# Patient Record
Sex: FEMALE | Race: WHITE | NOT HISPANIC OR LATINO | Employment: OTHER | URBAN - METROPOLITAN AREA
[De-identification: names, ages, dates, MRNs, and addresses within clinical notes are randomized per-mention and may not be internally consistent; named-entity substitution may affect disease eponyms.]

---

## 2022-09-17 ENCOUNTER — HOSPITAL ENCOUNTER (EMERGENCY)
Facility: HOSPITAL | Age: 52
Discharge: HOME | End: 2022-09-17
Attending: STUDENT IN AN ORGANIZED HEALTH CARE EDUCATION/TRAINING PROGRAM | Admitting: STUDENT IN AN ORGANIZED HEALTH CARE EDUCATION/TRAINING PROGRAM

## 2022-09-17 ENCOUNTER — APPOINTMENT (OUTPATIENT)
Dept: GENERAL RADIOLOGY | Facility: HOSPITAL | Age: 52
End: 2022-09-17

## 2022-09-17 VITALS
SYSTOLIC BLOOD PRESSURE: 137 MMHG | BODY MASS INDEX: 55.32 KG/M2 | OXYGEN SATURATION: 96 % | RESPIRATION RATE: 24 BRPM | TEMPERATURE: 98.3 F | HEART RATE: 66 BPM | HEIGHT: 61 IN | DIASTOLIC BLOOD PRESSURE: 80 MMHG | WEIGHT: 293 LBS

## 2022-09-17 DIAGNOSIS — J06.9 VIRAL URI WITH COUGH: Primary | ICD-10-CM

## 2022-09-17 LAB
ALBUMIN SERPL-MCNC: 3.8 G/DL (ref 3.5–5.2)
ALBUMIN/GLOB SERPL: 1.2 G/DL
ALP SERPL-CCNC: 96 U/L (ref 39–117)
ALT SERPL W P-5'-P-CCNC: 23 U/L (ref 1–33)
ANION GAP SERPL CALCULATED.3IONS-SCNC: 14.2 MMOL/L (ref 5–15)
AST SERPL-CCNC: 20 U/L (ref 1–32)
BASOPHILS # BLD AUTO: 0.05 10*3/MM3 (ref 0–0.2)
BASOPHILS NFR BLD AUTO: 0.5 % (ref 0–1.5)
BILIRUB SERPL-MCNC: 0.7 MG/DL (ref 0–1.2)
BUN SERPL-MCNC: 9 MG/DL (ref 6–20)
BUN/CREAT SERPL: 8.9 (ref 7–25)
CALCIUM SPEC-SCNC: 9.5 MG/DL (ref 8.6–10.5)
CHLORIDE SERPL-SCNC: 103 MMOL/L (ref 98–107)
CO2 SERPL-SCNC: 19.8 MMOL/L (ref 22–29)
CREAT SERPL-MCNC: 1.01 MG/DL (ref 0.57–1)
DEPRECATED RDW RBC AUTO: 39.9 FL (ref 37–54)
EGFRCR SERPLBLD CKD-EPI 2021: 67.1 ML/MIN/1.73
EOSINOPHIL # BLD AUTO: 0.21 10*3/MM3 (ref 0–0.4)
EOSINOPHIL NFR BLD AUTO: 2.2 % (ref 0.3–6.2)
ERYTHROCYTE [DISTWIDTH] IN BLOOD BY AUTOMATED COUNT: 12.8 % (ref 12.3–15.4)
FLUAV AG NPH QL: NEGATIVE
FLUBV AG NPH QL IA: NEGATIVE
GLOBULIN UR ELPH-MCNC: 3.3 GM/DL
GLUCOSE SERPL-MCNC: 150 MG/DL (ref 65–99)
HCT VFR BLD AUTO: 44.3 % (ref 34–46.6)
HGB BLD-MCNC: 13.9 G/DL (ref 12–15.9)
HOLD SPECIMEN: NORMAL
HOLD SPECIMEN: NORMAL
IMM GRANULOCYTES # BLD AUTO: 0.04 10*3/MM3 (ref 0–0.05)
IMM GRANULOCYTES NFR BLD AUTO: 0.4 % (ref 0–0.5)
LYMPHOCYTES # BLD AUTO: 2.46 10*3/MM3 (ref 0.7–3.1)
LYMPHOCYTES NFR BLD AUTO: 25.2 % (ref 19.6–45.3)
MCH RBC QN AUTO: 27 PG (ref 26.6–33)
MCHC RBC AUTO-ENTMCNC: 31.4 G/DL (ref 31.5–35.7)
MCV RBC AUTO: 86.2 FL (ref 79–97)
MONOCYTES # BLD AUTO: 0.56 10*3/MM3 (ref 0.1–0.9)
MONOCYTES NFR BLD AUTO: 5.7 % (ref 5–12)
NEUTROPHILS NFR BLD AUTO: 6.44 10*3/MM3 (ref 1.7–7)
NEUTROPHILS NFR BLD AUTO: 66 % (ref 42.7–76)
NRBC BLD AUTO-RTO: 0 /100 WBC (ref 0–0.2)
NT-PROBNP SERPL-MCNC: 98.5 PG/ML (ref 0–900)
PLATELET # BLD AUTO: 188 10*3/MM3 (ref 140–450)
PMV BLD AUTO: 11.6 FL (ref 6–12)
POTASSIUM SERPL-SCNC: 4 MMOL/L (ref 3.5–5.2)
PROT SERPL-MCNC: 7.1 G/DL (ref 6–8.5)
RBC # BLD AUTO: 5.14 10*6/MM3 (ref 3.77–5.28)
SARS-COV-2 RNA PNL SPEC NAA+PROBE: NOT DETECTED
SODIUM SERPL-SCNC: 137 MMOL/L (ref 136–145)
TROPONIN T SERPL-MCNC: <0.01 NG/ML (ref 0–0.03)
WBC NRBC COR # BLD: 9.76 10*3/MM3 (ref 3.4–10.8)
WHOLE BLOOD HOLD COAG: NORMAL
WHOLE BLOOD HOLD SPECIMEN: NORMAL

## 2022-09-17 PROCEDURE — 85025 COMPLETE CBC W/AUTO DIFF WBC: CPT | Performed by: STUDENT IN AN ORGANIZED HEALTH CARE EDUCATION/TRAINING PROGRAM

## 2022-09-17 PROCEDURE — 93010 ELECTROCARDIOGRAM REPORT: CPT | Performed by: INTERNAL MEDICINE

## 2022-09-17 PROCEDURE — 80053 COMPREHEN METABOLIC PANEL: CPT | Performed by: STUDENT IN AN ORGANIZED HEALTH CARE EDUCATION/TRAINING PROGRAM

## 2022-09-17 PROCEDURE — 87804 INFLUENZA ASSAY W/OPTIC: CPT | Performed by: PHYSICIAN ASSISTANT

## 2022-09-17 PROCEDURE — 93005 ELECTROCARDIOGRAM TRACING: CPT

## 2022-09-17 PROCEDURE — 99284 EMERGENCY DEPT VISIT MOD MDM: CPT

## 2022-09-17 PROCEDURE — 36415 COLL VENOUS BLD VENIPUNCTURE: CPT | Performed by: STUDENT IN AN ORGANIZED HEALTH CARE EDUCATION/TRAINING PROGRAM

## 2022-09-17 PROCEDURE — 83880 ASSAY OF NATRIURETIC PEPTIDE: CPT

## 2022-09-17 PROCEDURE — U0004 COV-19 TEST NON-CDC HGH THRU: HCPCS | Performed by: PHYSICIAN ASSISTANT

## 2022-09-17 PROCEDURE — 93005 ELECTROCARDIOGRAM TRACING: CPT | Performed by: STUDENT IN AN ORGANIZED HEALTH CARE EDUCATION/TRAINING PROGRAM

## 2022-09-17 PROCEDURE — 84484 ASSAY OF TROPONIN QUANT: CPT

## 2022-09-17 PROCEDURE — 71045 X-RAY EXAM CHEST 1 VIEW: CPT

## 2022-09-17 PROCEDURE — C9803 HOPD COVID-19 SPEC COLLECT: HCPCS | Performed by: PHYSICIAN ASSISTANT

## 2022-09-17 RX ORDER — HYDROXYZINE HYDROCHLORIDE 25 MG/1
25 TABLET, FILM COATED ORAL 3 TIMES DAILY PRN
COMMUNITY

## 2022-09-17 RX ORDER — SPIRONOLACTONE 50 MG/1
50 TABLET, FILM COATED ORAL DAILY
COMMUNITY

## 2022-09-17 RX ORDER — BUDESONIDE AND FORMOTEROL FUMARATE DIHYDRATE 80; 4.5 UG/1; UG/1
2 AEROSOL RESPIRATORY (INHALATION)
COMMUNITY

## 2022-09-17 RX ORDER — LORATADINE 10 MG/1
10 TABLET ORAL DAILY
COMMUNITY

## 2022-09-17 RX ORDER — BENZONATATE 100 MG/1
100 CAPSULE ORAL 3 TIMES DAILY PRN
Qty: 21 CAPSULE | Refills: 0 | Status: SHIPPED | OUTPATIENT
Start: 2022-09-17 | End: 2022-09-24

## 2022-09-17 RX ORDER — AZITHROMYCIN 250 MG/1
TABLET, FILM COATED ORAL
Qty: 6 TABLET | Refills: 0 | Status: SHIPPED | OUTPATIENT
Start: 2022-09-17

## 2022-09-17 RX ORDER — MONTELUKAST SODIUM 10 MG/1
10 TABLET ORAL NIGHTLY
COMMUNITY

## 2022-09-17 RX ORDER — BENZONATATE 100 MG/1
100 CAPSULE ORAL ONCE
Status: COMPLETED | OUTPATIENT
Start: 2022-09-17 | End: 2022-09-17

## 2022-09-17 RX ORDER — ALBUTEROL SULFATE 2.5 MG/3ML
2.5 SOLUTION RESPIRATORY (INHALATION) EVERY 4 HOURS PRN
COMMUNITY

## 2022-09-17 RX ORDER — HYDROCHLOROTHIAZIDE 50 MG/1
50 TABLET ORAL DAILY
COMMUNITY

## 2022-09-17 RX ORDER — METOPROLOL SUCCINATE 25 MG/1
25 TABLET, EXTENDED RELEASE ORAL DAILY
COMMUNITY

## 2022-09-17 RX ORDER — SODIUM CHLORIDE 0.9 % (FLUSH) 0.9 %
10 SYRINGE (ML) INJECTION AS NEEDED
Status: DISCONTINUED | OUTPATIENT
Start: 2022-09-17 | End: 2022-09-17 | Stop reason: HOSPADM

## 2022-09-17 RX ORDER — LEVOTHYROXINE SODIUM 0.07 MG/1
75 TABLET ORAL DAILY
COMMUNITY

## 2022-09-17 RX ADMIN — BENZONATATE 100 MG: 100 CAPSULE ORAL at 11:07

## 2022-09-17 NOTE — DISCHARGE INSTRUCTIONS
Your COVID-19 test results positive please contact your pulmonologist for recommendations on treatment options.  If you experience severe worsening shortness of breath, chest pain, lightheadedness dizziness return to ED for further evaluation.  I prescribed antibiotic as well as a cough suppressant.  We have provided an incentive spirometer.  Please use this to help prevent pneumonia as discussed. Use your albuterol as needed for shortness of breath as prescribed.

## 2022-09-17 NOTE — ED PROVIDER NOTES
Subjective   History of Present Illness  The patient is a 52-year-old female with a past medical history of asthma, COVID-19 long-haul her, hypertension, hypothyroidism, sleep apnea presents emergency department with chief complaint of cough, congestion, shortness of breath.  Patient states started with mild congestion on Monday (5 days ago). Thought this could be 2/2 allergies. Has been using flonase and nasal saline which started to improve symptoms. Was seen by pulmonology earlier this week and had clear CXR. Started having mild cough 2 days ago, worsening yesterday. Currently taking symbicort and has albuterol inhaler at home, but has not been using it. Of note, pt had complicated course of COVID-19 where she was intubated 8 days. Has shortness of breath on exertion at baseline.     History provided by:  Patient   used: No    Influenza  Presenting symptoms: cough (mildly productive), diarrhea (mild yesterday), headache and shortness of breath    Presenting symptoms: no fever, no myalgias, no nausea, no sore throat and no vomiting    Headaches:     Severity:  Moderate    Onset quality:  Gradual    Duration:  2 days    Timing:  Constant    Progression:  Worsening    Chronicity:  New  Severity:  Moderate  Onset quality:  Gradual  Duration:  2 days  Progression:  Worsening  Chronicity:  New  Relieved by:  Nothing  Exacerbated by: walking, coughing.  Ineffective treatments:  None tried  Associated symptoms: chills and nasal congestion        Review of Systems   Constitutional: Positive for chills. Negative for fever.   HENT: Positive for congestion. Negative for sore throat.    Respiratory: Positive for cough (mildly productive) and shortness of breath.    Cardiovascular: Negative for chest pain, palpitations and leg swelling.   Gastrointestinal: Positive for diarrhea (mild yesterday). Negative for abdominal pain, nausea and vomiting.   Genitourinary: Negative for dysuria.   Musculoskeletal:  "Negative for myalgias.   Neurological: Positive for headaches.   All other systems reviewed and are negative.      Past Medical History:   Diagnosis Date   • Arthritis    • Asthma    • COVID-19 long hauler     2 years   • Hypertension    • Hypothyroidism    • Sleep apnea        Allergies   Allergen Reactions   • Demerol [Meperidine] Shortness Of Breath   • Other Anaphylaxis     pt rec'd \"gi cocktail\"   • Hydrocodone Other (See Comments)     Suicidal      • Prednisone Other (See Comments)     hypertenson       • Vancomycin Other (See Comments)     Red dejah syndrome        Past Surgical History:   Procedure Laterality Date   • CARDIAC CATHETERIZATION         History reviewed. No pertinent family history.    Social History     Socioeconomic History   • Marital status: Single   Tobacco Use   • Smoking status: Never Smoker   • Smokeless tobacco: Never Used           Objective   Physical Exam  Vitals and nursing note reviewed.   Constitutional:       Appearance: Normal appearance. She is obese. She is not ill-appearing or toxic-appearing.   HENT:      Head: Normocephalic.      Nose: Nose normal.      Mouth/Throat:      Mouth: Mucous membranes are moist.   Eyes:      Conjunctiva/sclera: Conjunctivae normal.   Cardiovascular:      Rate and Rhythm: Normal rate and regular rhythm.      Heart sounds: No murmur heard.  Pulmonary:      Effort: Pulmonary effort is normal.      Breath sounds: Normal breath sounds. No decreased breath sounds, wheezing, rhonchi or rales.   Musculoskeletal:         General: Normal range of motion.      Cervical back: Normal range of motion.      Right lower leg: No edema.      Left lower leg: No edema.   Skin:     General: Skin is warm and dry.      Capillary Refill: Capillary refill takes less than 2 seconds.   Neurological:      Mental Status: She is alert.         Procedures           ED Course                                           MDM     Very pleasant 52-year-old female presents to ED with " chief complaint of cough and shortness of breath.  She had a complicated course of COVID-19 in 2020 which required 8 days of mechanical ventilation.  She is COVID vaccinated.  Cough, shortness of breath started 2 days ago.  Nonproductive.  Has not required albuterol inhaler.  Had pulmonology appt this week that was good.     Breathsounds are clear to auscultation bilaterally, no wheezing or respiratory distress  Oxygen is 97% on room air, ambulatory oxygen dropped to 92%    CBC with WBC of 9.7, no neutrophilia, H&H of NL  BMP WNL  Trope negative  CMP with minimally elevated creatinine 1.01 which is improved from all prior on file, normal hepatic function, no anion gap  Flu negative  COVID pending  CXR shows no cardiopulmonary abnormality  EKG unremarkable    The patient is resting comfortably and feels better, is alert and in no distress. Because of complicated course previously requesting azithromycin. I feel the benefits outweigh the risks at this time. Will provide incentive spirometer and rx for tessalon perles. If COVID positive pt advised to call her pulmonologist to get recommendations on treatment options.     On re-examination the patient does not appear toxic and has no meningeal signs (including a negative Kernig and Brudzinski sign), and there's no intractable vomiting, respiratory distress and no apparent pain. Based on the history, exam, diagnostic testing and reassessment, the patient has no signs of meningitis, significant pneumonia, pyelonephritis, sepsis or other acute serious bacterial infections, or other significant pathology to warrant further testing, continued ED treatment, admission or specialist evaluation. The patient's vital signs have been stable. The patient's condition is stable and is appropriate for discharge.  The patient´s symptoms are consistent with a viral syndrome. The patient was counseled to return to the ED for re-evaluation for worsening cough, shortness of breath,  uncontrollable headache, uncontrollable fever, altered mental status, or any symptoms which cause them concern. The patient will pursue further outpatient evaluation with the primary care physician or other designated or consultant physician as indicated in the discharge instructions.     Labs Reviewed   COMPREHENSIVE METABOLIC PANEL - Abnormal; Notable for the following components:       Result Value    Glucose 150 (*)     Creatinine 1.01 (*)     CO2 19.8 (*)     All other components within normal limits    Narrative:     GFR Normal >60  Chronic Kidney Disease <60  Kidney Failure <15     CBC WITH AUTO DIFFERENTIAL - Abnormal; Notable for the following components:    MCHC 31.4 (*)     All other components within normal limits   INFLUENZA ANTIGEN, RAPID - Normal   BNP (IN-HOUSE) - Normal    Narrative:     Among patients with dyspnea, NT-proBNP is highly sensitive for the detection of acute congestive heart failure. In addition NT-proBNP of <300 pg/ml effectively rules out acute congestive heart failure with 99% negative predictive value.    Results may be falsely decreased if patient taking Biotin.     TROPONIN (IN-HOUSE) - Normal    Narrative:     Troponin T Reference Range:  <= 0.03 ng/mL-   Negative for AMI  >0.03 ng/mL-     Abnormal for myocardial necrosis.  Clinicians would have to utilize clinical acumen, EKG, Troponin and serial changes to determine if it is an Acute Myocardial Infarction or myocardial injury due to an underlying chronic condition.       Results may be falsely decreased if patient taking Biotin.     COVID-19,APTIMA PANTHER (ELYSIA) YEVGENIY/ANDREINA CORLEY, NP/OP SWAB IN UTM/VTM/SALINE TRANSPORT MEDIA,24 HR TAT   RAINBOW DRAW    Narrative:     The following orders were created for panel order Hart Draw.  Procedure                               Abnormality         Status                     ---------                               -----------         ------                     Green Top (Gel)[[id number]]                                   Final result               Lavender Top[[id number]]                                     Final result               Gold Top - SST[[id number]]                                   Final result               Light Blue Top[[id number]]                                   Final result                 Please view results for these tests on the individual orders.   CBC AND DIFFERENTIAL    Narrative:     The following orders were created for panel order CBC & Differential.  Procedure                               Abnormality         Status                     ---------                               -----------         ------                     CBC Auto Differential[[id number]]        Abnormal            Final result                 Please view results for these tests on the individual orders.   GREEN TOP   LAVENDER TOP   GOLD TOP - SST   LIGHT BLUE TOP      XR Chest 1 View   Final Result    No active disease is seen.                        LISSA CLAROS MD          Electronically Signed and Approved By: LISSA CLAROS MD on 9/17/2022 at 10:00                              Final diagnoses:   Viral URI with cough       ED Disposition  ED Disposition     ED Disposition   Discharge    Condition   Stable    Comment   --             Mario Al, APRN  [address] [facility], [facility] [address]  [facility] [address] [phone]          [facility] EMERGENCY ROOM  [address] [facility] [address] [phone]    If symptoms worsen         Medication List      New Prescriptions    azithromycin 250 MG tablet  Commonly known as: Zithromax Z-Junior  Take 2 tablets by mouth on day 1, then 1 tablet daily on days 2-5     benzonatate 100 MG capsule  Commonly known as: TESSALON  Take 1 capsule by mouth 3 (Three) Times a Day As Needed for Cough for up to 7 days.           Where to Get Your Medications      These medications were sent to [facility]/pharmacy #[id number] [facility], [place] - [address] [facility]  - [phone] [facility] [phone] FX  [address] [place] [address]    Hours: 24-hours Phone: [phone]   · azithromycin 250 MG tablet  · benzonatate 100 MG capsule          Obi Fox, JOI  09/17/22 4755

## 2022-09-22 LAB — QT INTERVAL: 407 MS

## 2024-07-20 ENCOUNTER — APPOINTMENT (OUTPATIENT)
Dept: GENERAL RADIOLOGY | Facility: HOSPITAL | Age: 54
End: 2024-07-20
Payer: MEDICARE

## 2024-07-20 VITALS
TEMPERATURE: 98.5 F | HEART RATE: 104 BPM | RESPIRATION RATE: 20 BRPM | OXYGEN SATURATION: 99 % | DIASTOLIC BLOOD PRESSURE: 95 MMHG | SYSTOLIC BLOOD PRESSURE: 168 MMHG

## 2024-07-20 PROCEDURE — 73562 X-RAY EXAM OF KNEE 3: CPT

## 2024-07-20 PROCEDURE — 99283 EMERGENCY DEPT VISIT LOW MDM: CPT

## 2024-07-20 PROCEDURE — 73630 X-RAY EXAM OF FOOT: CPT

## 2024-07-20 PROCEDURE — 96372 THER/PROPH/DIAG INJ SC/IM: CPT

## 2024-07-21 ENCOUNTER — HOSPITAL ENCOUNTER (EMERGENCY)
Facility: HOSPITAL | Age: 54
Discharge: HOME | End: 2024-07-21
Attending: EMERGENCY MEDICINE | Admitting: EMERGENCY MEDICINE
Payer: MEDICARE
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
## 2024-07-21 DIAGNOSIS — S83.92XA SPRAIN OF LEFT KNEE, UNSPECIFIED LIGAMENT, INITIAL ENCOUNTER: ICD-10-CM

## 2024-07-21 DIAGNOSIS — S90.32XA CONTUSION OF LEFT FOOT, INITIAL ENCOUNTER: Primary | ICD-10-CM

## 2024-07-21 PROCEDURE — 96372 THER/PROPH/DIAG INJ SC/IM: CPT

## 2024-07-21 PROCEDURE — 25010000002 KETOROLAC TROMETHAMINE PER 15 MG: Performed by: NURSE PRACTITIONER

## 2024-07-21 RX ORDER — KETOROLAC TROMETHAMINE 30 MG/ML
30 INJECTION, SOLUTION INTRAMUSCULAR; INTRAVENOUS ONCE
Status: COMPLETED | OUTPATIENT
Start: 2024-07-21 | End: 2024-07-21

## 2024-07-21 RX ADMIN — KETOROLAC TROMETHAMINE 30 MG: 30 INJECTION, SOLUTION INTRAMUSCULAR; INTRAVENOUS at 02:01

## 2024-07-21 NOTE — ED PROVIDER NOTES
"Time: 10:18 PM EDT  Date of encounter:  7/20/2024  Independent Historian/Clinical History and Information was obtained by:   Patient    History is limited by: N/A    Chief Complaint: LEFT FOOT/KNEE INJURY      History of Present Illness:  The patient is a 54 y.o. year old female who presents to the emergency department for evaluation of left foot pain, left knee pain.  The patient states that she fell from standing while walking.  She denies any head trauma or LOC.  She has been having trouble putting weight on her left toes.  She denies any previous injury.  She is neurovascular intact.  She can flex and extend the toes to the ankle and when the but does report increased pain with any type of movement or weightbearing.  There is no obvious deformity, bruising, or significant swelling noted.      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    Home Medications:  Prior to Admission medications    Medication Sig Start Date End Date Taking? Authorizing Provider   albuterol (PROVENTIL) (2.5 MG/3ML) 0.083% nebulizer solution Take 2.5 mg by nebulization Every 4 (Four) Hours As Needed for Wheezing.    Provider, Leanna, MD   azithromycin (Zithromax Z-Junior) 250 MG tablet Take 2 tablets by mouth on day 1, then 1 tablet daily on days 2-5 9/17/22   Obi Fox PA-C   budesonide-formoterol (SYMBICORT) 80-4.5 MCG/ACT " inhaler Inhale 2 puffs 2 (Two) Times a Day.    Leanna Gusman MD   hydroCHLOROthiazide (HYDRODIURIL) 50 MG tablet Take 50 mg by mouth Daily.    Leanna Gusman MD   hydrOXYzine (ATARAX) 25 MG tablet Take 25 mg by mouth 3 (Three) Times a Day As Needed for Itching.    Leanna Gusman MD   levothyroxine (SYNTHROID, LEVOTHROID) 75 MCG tablet Take 75 mcg by mouth Daily.    Leanna Gusman MD   loratadine (Claritin) 10 MG tablet Take 10 mg by mouth Daily.    Leanna Gusman MD   metoprolol succinate XL (TOPROL-XL) 25 MG 24 hr tablet Take 25 mg by mouth Daily.    Leanna Gusman MD   montelukast (SINGULAIR) 10 MG tablet Take 10 mg by mouth Every Night.    Leanna Gusman MD   spironolactone (ALDACTONE) 50 MG tablet Take 50 mg by mouth Daily.    Leanna Gusman MD        Social History:   Social History     Tobacco Use    Smoking status: Never    Smokeless tobacco: Never         Review of Systems:  Review of Systems   Constitutional:  Negative for chills and fever.   HENT:  Negative for congestion, ear pain and sore throat.    Eyes:  Negative for pain.   Respiratory:  Negative for cough, chest tightness and shortness of breath.    Cardiovascular:  Negative for chest pain.   Gastrointestinal:  Negative for abdominal pain, diarrhea, nausea and vomiting.   Genitourinary:  Negative for flank pain and hematuria.   Musculoskeletal:  Positive for arthralgias, gait problem and myalgias. Negative for joint swelling, neck pain and neck stiffness.   Skin:  Negative for color change, pallor and rash.   Neurological:  Negative for seizures and headaches.   All other systems reviewed and are negative.       Physical Exam:  /95   Pulse 104   Temp 98.5 °F (36.9 °C)   Resp 20   SpO2 99%     Physical Exam  Vitals and nursing note reviewed.   Constitutional:       General: She is not in acute distress.     Appearance: Normal appearance. She is not ill-appearing or toxic-appearing.    HENT:      Head: Normocephalic and atraumatic.   Eyes:      General: No scleral icterus.     Conjunctiva/sclera: Conjunctivae normal.      Pupils: Pupils are equal, round, and reactive to light.   Cardiovascular:      Rate and Rhythm: Normal rate and regular rhythm.      Pulses: Normal pulses.   Pulmonary:      Effort: Pulmonary effort is normal. No respiratory distress.   Musculoskeletal:         General: Tenderness and signs of injury present. No swelling or deformity. Normal range of motion.      Cervical back: Normal range of motion.   Skin:     General: Skin is warm and dry.      Capillary Refill: Capillary refill takes less than 2 seconds.      Findings: No bruising, erythema or rash.   Neurological:      General: No focal deficit present.      Mental Status: She is alert and oriented to person, place, and time. Mental status is at baseline.   Psychiatric:         Mood and Affect: Mood normal.         Behavior: Behavior normal.                Procedures:  Procedures      Medical Decision Making:      Comorbidities that affect care:    Hypothyroid, asthma, arthritis, COVID-19 long hauler, sleep apnea, hypertension    External Notes reviewed:    None      The following orders were placed and all results were independently analyzed by me:  Orders Placed This Encounter   Procedures    [facility] 08.  CAM Boot; Yes; No; Yes    XR Foot 3+ View Left    XR Knee 3 View Left    Apply ace wrap    Obtain & Apply The Following- Lower extremity; Knee immobilizer       Medications Given in the Emergency Department:  Medications   ketorolac (TORADOL) injection 30 mg (30 mg Intramuscular Given 7/21/24 0201)        ED Course:    ED Course as of 07/21/24 0724   Sat Jul 20, 2024   2222 PROVIDER IN TRIAGE  Patient was evaluated by Florentin pickard PA-C. Orders were placed and awaiting final results and disposition.   [MV]      ED Course User Index  [MV] Florentin Mahan PA       Labs:    Lab Results (last 24 hours)        ** No results found for the last 24 hours. **             Imaging:    XR Foot 3+ View Left    Result Date: 7/20/2024  XR FOOT 3+ VW LEFT Date of Exam: 7/20/2024 10:36 PM EDT Indication: Pain after fall. Comparison: None available. Findings: Posterior and plantar calcaneal spurs are present. Mild degenerative disease noted at the first MTP joint. No acute fracture or dislocation. No bone erosion or destruction. No foreign body.     Degenerative changes. No acute findings. Electronically Signed: Kyle Thomas MD  7/20/2024 10:51 PM EDT  Workstation ID: [id number]    XR Knee 3 View Left    Result Date: 7/20/2024  XR KNEE 3 VW LEFT Date of Exam: 7/20/2024 10:36 PM EDT Indication: Pain after fall. Comparison: None available. Findings: There is tricompartmental osteoarthritis with mild medial compartment joint space narrowing. No fracture or dislocation. No bone erosion or destruction. No joint effusion.     Degenerative disease. No acute findings. Electronically Signed: Kyle Thomas MD  7/20/2024 10:49 PM EDT  Workstation ID: [id number]       Differential Diagnosis and Discussion:    Extremity Pain: Differential diagnosis includes but is not limited to soft tissue sprain, tendonitis, tendon injury, dislocation, fracture, deep vein thrombosis, arterial insufficiency, osteoarthritis, bursitis, and ligamentous damage.  Joint Pain: Differential diagnosis includes but is not limited to polyarticular arthritis, gout, tendinitis, hemarthrosis, septic arthritis, rheumatoid arthritis, bursitis, degenerative joint disease, joint effusion, autoimmune disorder, trauma, and occult neoplasm.    All X-rays impressions were independently interpreted by me.    MDM  Number of Diagnoses or Management Options  Contusion of left foot, initial encounter: new and requires workup  Fall, initial encounter: minor  Sprain of left knee, unspecified ligament, initial encounter: new and requires workup     Amount and/or Complexity of Data  Reviewed  Tests in the radiology section of CPT®: reviewed    Risk of Complications, Morbidity, and/or Mortality  Presenting problems: low  Diagnostic procedures: low  Management options: low    Patient Progress  Patient progress: stable         Patient Care Considerations:    LABS: I considered ordering labs, however considering the patient stable condition and complaint I did not feel it was warranted at this time.      Consultants/Shared Management Plan:    None    Social Determinants of Health:    Patient is independent, reliable, and has access to care.       Disposition and Care Coordination:    Discharged: The patient is suitable and stable for discharge with no need for consideration of admission.    I have explained the patient´s condition, diagnoses and treatment plan based on the information available to me at this time. I have answered questions and addressed any concerns. The patient has a good  understanding of the patient´s diagnosis, condition, and treatment plan as can be expected at this point. The vital signs have been stable. The patient´s condition is stable and appropriate for discharge from the emergency department.      The patient will pursue further outpatient evaluation with the primary care physician or other designated or consulting physician as outlined in the discharge instructions. They are agreeable to this plan of care and follow-up instructions have been explained in detail. The patient has received these instructions in written format and has expressed an understanding of the discharge instructions. The patient is aware that any significant change in condition or worsening of symptoms should prompt an immediate return to this or the closest emergency department or call to 911.  I have explained discharge medications and the need for follow up with the patient/caretakers. This was also printed in the discharge instructions. Patient was discharged with the following medications and  follow up:      Medication List      No changes were made to your prescriptions during this visit.      Abe Mancuso MD  [address]   [place] [address]  [phone]    Call   As needed, FOR FOLLOW UP    Mario Al, APRN  [address]  [facility] [id number] [phone]    Call   FOR FOLLOW UP       Final diagnoses:   Contusion of left foot, initial encounter   Sprain of left knee, unspecified ligament, initial encounter   Fall, initial encounter        ED Disposition       ED Disposition   Discharge    Condition   Stable    Comment   --               This medical record created using voice recognition software.             Shantelle Gomez, APRN  07/21/24 7021

## 2024-07-21 NOTE — DISCHARGE INSTRUCTIONS
Rest, ice, and elevate.  Gentle range of motion stretches with your foot and knee followed by 20 minutes of ice to each several times daily.  Use your Ace wrap to help with swelling and comfort.  Use your walking boot for ambulation.  Use your home walker to help stabilize yourself with walking.  Follow-up either with your orthopedic in [place] or with Dr. Mancuso for further evaluation and treatment.  Follow-up with your family doctor as needed.  Return to the emergency department immediately for any acutely developing redness, any increase in swelling, any inability to flex or extend your ankle foot or knee or any new or worse concerns.

## 2024-10-22 ENCOUNTER — OFFICE VISIT (OUTPATIENT)
Dept: PULMONOLOGY | Facility: CLINIC | Age: 54
End: 2024-10-22
Payer: MEDICARE

## 2024-10-22 VITALS
OXYGEN SATURATION: 99 % | RESPIRATION RATE: 20 BRPM | BODY MASS INDEX: 55.32 KG/M2 | TEMPERATURE: 97.9 F | HEIGHT: 61 IN | DIASTOLIC BLOOD PRESSURE: 72 MMHG | HEART RATE: 71 BPM | SYSTOLIC BLOOD PRESSURE: 136 MMHG | WEIGHT: 293 LBS

## 2024-10-22 DIAGNOSIS — J45.20 MILD INTERMITTENT ASTHMA, UNSPECIFIED WHETHER COMPLICATED: ICD-10-CM

## 2024-10-22 DIAGNOSIS — U09.9 POST-COVID CHRONIC DYSPNEA: Primary | ICD-10-CM

## 2024-10-22 DIAGNOSIS — R06.09 POST-COVID CHRONIC DYSPNEA: Primary | ICD-10-CM

## 2024-10-22 PROCEDURE — 1160F RVW MEDS BY RX/DR IN RCRD: CPT | Performed by: INTERNAL MEDICINE

## 2024-10-22 PROCEDURE — 99204 OFFICE O/P NEW MOD 45 MIN: CPT | Performed by: INTERNAL MEDICINE

## 2024-10-22 PROCEDURE — 1159F MED LIST DOCD IN RCRD: CPT | Performed by: INTERNAL MEDICINE

## 2024-10-22 RX ORDER — KETOROLAC TROMETHAMINE 4 MG/ML
SOLUTION/ DROPS OPHTHALMIC EVERY 6 HOURS SCHEDULED
COMMUNITY

## 2024-10-22 RX ORDER — DIPHENOXYLATE HYDROCHLORIDE AND ATROPINE SULFATE 2.5; .025 MG/1; MG/1
1 TABLET ORAL DAILY
COMMUNITY

## 2024-10-22 RX ORDER — LANSOPRAZOLE 30 MG/1
CAPSULE, DELAYED RELEASE ORAL
COMMUNITY

## 2024-10-22 RX ORDER — ECHINACEA PURPUREA EXTRACT 125 MG
1 TABLET ORAL
COMMUNITY

## 2024-10-22 RX ORDER — METFORMIN HCL 500 MG
500 TABLET, EXTENDED RELEASE 24 HR ORAL
COMMUNITY
Start: 2024-08-12

## 2024-10-22 RX ORDER — SACCHAROMYCES BOULARDII 250 MG
250 CAPSULE ORAL 2 TIMES DAILY
COMMUNITY

## 2024-10-22 RX ORDER — BIOTIN 10000 MCG
CAPSULE ORAL DAILY
COMMUNITY

## 2024-10-22 RX ORDER — DIPHENHYDRAMINE HCL 25 MG
25 CAPSULE ORAL
COMMUNITY

## 2024-10-22 RX ORDER — DEXTROMETHORPHAN HYDROBROMIDE AND PROMETHAZINE HYDROCHLORIDE 15; 6.25 MG/5ML; MG/5ML
SYRUP ORAL
COMMUNITY
Start: 2024-07-29

## 2024-10-22 RX ORDER — ACETAMINOPHEN 325 MG/1
650 TABLET ORAL EVERY 6 HOURS PRN
COMMUNITY

## 2024-10-22 RX ORDER — LEVOCETIRIZINE DIHYDROCHLORIDE 5 MG/1
5 TABLET, FILM COATED ORAL DAILY
COMMUNITY

## 2024-10-22 RX ORDER — ACETAMINOPHEN 500 MG
TABLET ORAL EVERY 6 HOURS PRN
COMMUNITY

## 2024-10-22 RX ORDER — GABAPENTIN 300 MG/1
300 CAPSULE ORAL
COMMUNITY
Start: 2024-09-26

## 2024-10-22 RX ORDER — FLUTICASONE PROPIONATE 50 UG/1
SPRAY, METERED NASAL EVERY 24 HOURS
COMMUNITY

## 2024-10-22 RX ORDER — LIDOCAINE HYDROCHLORIDE 20 MG/ML
SOLUTION OROPHARYNGEAL
COMMUNITY
Start: 2024-06-14

## 2024-10-22 RX ORDER — AZELASTINE HYDROCHLORIDE 0.5 MG/ML
SOLUTION/ DROPS OPHTHALMIC EVERY 12 HOURS SCHEDULED
COMMUNITY

## 2024-10-22 RX ORDER — BUDESONIDE AND FORMOTEROL FUMARATE DIHYDRATE 80; 4.5 UG/1; UG/1
2 AEROSOL RESPIRATORY (INHALATION)
Qty: 10.2 EACH | Refills: 6 | Status: SHIPPED | OUTPATIENT
Start: 2024-10-22

## 2024-10-22 RX ORDER — FEXOFENADINE HCL 180 MG/1
1 TABLET ORAL DAILY
COMMUNITY

## 2024-10-22 RX ORDER — CETIRIZINE HYDROCHLORIDE 10 MG/1
10 TABLET ORAL DAILY
COMMUNITY

## 2024-10-22 NOTE — PROGRESS NOTES
Chief Complaint  Establish Care (New patient- Post Covid-19 Syndrome), Cough (Dry cough every now and then), and Shortness of Breath (SOA- consistent, seems to be getting worse)    Subjective        Stefani Villafana presents to [facility] PULMONARY & CRITICAL CARE MEDICINE  History of Present Illness  History of Present Illness  The patient is a 54-year-old female who presents for evaluation of lung problems.    She contracted COVID-19 in 03/2020, which necessitated an 8-day period on a ventilator. Her treatment included hydroxychloroquine and azithromycin. She was discharged from the hospital 2 to 3 days after being weaned off the ventilator and was sent home with a COVID-19 home program. She was diagnosed with long COVID-19 by Dr. Romero and was advised to avoid strenuous activity to prevent a relapse. She experiences fatigue and shortness of breath, which have prevented her from returning to work. She was able to discontinue the use of a walker and began attending the gym 3 to 4 times a week. However, she experienced a setback in 10/2023, which left her unable to dress herself due to shortness of breath. A chest x-ray revealed fluid in her lungs, and she was diagnosed with heart failure. She experiences dizziness upon standing and has noticed changes in her orthostatic blood pressure readings. She underwent a right and left heart catheterization, which showed no pulmonary hypertension. She uses a CPAP machine and was switched from Symbicort to Breo, but has not been taking it due to difficulty obtaining it. Her last pulmonary function test (PFT) was over a year ago and showed normal results. She has not undergone pulmonary rehabilitation. She has been practicing mindfulness and breathing exercises. She participated in a remdesivir trial and believes she received the medication as her condition improved.    She has a history of asthma and allergies, but her asthma is currently well-managed. She  "keeps a rescue inhaler on hand but rarely needs to use it. She has not used a nebulizer since her hospital discharge. She experiences chest tightness and a burning sensation in her lungs, which she believes may be due to seasonal changes. She has noticed a drop in her oxygen levels when walking and has experienced lightheadedness.    She experiences anxiety when visiting the ER, which has led to panic attacks. She has requested Ativan for these visits.    She underwent a lumpectomy in 12/2020. She had a biopsy on her right breast and had a lumpectomy. She had another biopsy which was benign. She had a precancerous mass. They will not give her the medicine to keep it from going into cancer because of her COVID-19 symptoms and complications. She is being monitored closely. They found something on her last one and they were waiting until 01/2025 to redo it again.    She developed diabetes following her COVID-19 infection. She has experienced kidney issues and her kidney function fluctuates. She avoids NSAIDs to protect her kidneys. She was hospitalized for a week in 2023 due to vasculitis and was treated with steroids. She developed a stutter and tremor, which she attributes to the steroids. She was diagnosed with Henoch-Schonlein purpura (HSP), which caused wounds on her legs that are still visible.    She had a fall and broke her foot. She had issues with GERD. She had damaged vocal cords and did speech therapy.    SOCIAL HISTORY  She was a travel nurse. 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    ALLERGIES  She is allergic to TOBACCO.    Objective   Vital Signs:  /72 (BP Location: Other (Comment), Patient Position: Sitting, Cuff Size: Adult) Comment (BP Location): left wrist  Pulse 71   Temp 97.9 °F (36.6 °C) (Oral)   Resp 20   Ht 154.9 cm (61\")   Wt (!) 148 kg (327 lb)   SpO2 99% Comment: room air  BMI 61.79 kg/m²   Estimated body mass index is 61.79 kg/m² as calculated from the " "following:    Height as of this encounter: 154.9 cm (61\").    Weight as of this encounter: 148 kg (327 lb).          Physical Exam   Physical Exam  Pleasant female  Resting comfortably  In no acute distress  Lungs are clear to auscultation    Result Review :         Results                Assessment and Plan   Diagnoses and all orders for this visit:    1. Post-COVID chronic dyspnea (Primary)  -     Complete PFT - Pre & Post Bronchodilator; Future  -     6 Minute Walk Test; Future    2. Mild intermittent asthma, unspecified whether complicated  -     CBC & Differential; Future  -     IgE Level; Future    Other orders  -     budesonide-formoterol (SYMBICORT) 80-4.5 MCG/ACT inhaler; Inhale 2 puffs 2 (Two) Times a Day.  Dispense: 10.2 each; Refill: 6      Assessment & Plan  1. Post COVID-19 Syndrome.  Her asthma appears to be inactive at present. She reports persistent shortness of breath and fatigue, with symptoms worsening over the past few days. A pulmonary function test (PFT) and a 6-minute walk test will be conducted at our facility. If the PFT results indicate eligibility for pulmonary rehabilitation, this will be recommended. Insurance coverage for pulmonary rehabilitation will be verified to ensure affordability.    2. Asthma.  Her asthma is currently well-controlled. She is not experiencing nocturnal symptoms and rarely needs to use her rescue inhaler. She was previously switched from Symbicort to Breo but has had difficulty obtaining Breo due to preauthorization issues. She will be advised to use Symbicort as a controller inhaler if she experiences increased symptoms and needs to use her rescue inhaler frequently.    3. Anxiety and PTSD.  She experiences significant anxiety and panic attacks, particularly when visiting the ER, likely related to PTSD from her COVID-19 experience. She has been using hydroxyzine but finds it ineffective during severe anxiety episodes. She will discuss with her primary care " physician the possibility of using Ativan for premedication before ER visits.    4. Diabetes Mellitus.  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 She should continue monitoring her blood sugar levels and follow her current diabetes management plan.    5. Henoch-Schönlein Purpura (HSP).  She has a history of HSP, which was managed with steroids. She should continue to avoid NSAIDs to protect her kidney function.             Follow Up   Return in about 3 months (around 1/22/2025).  Patient was given instructions and counseling regarding her condition or for health maintenance advice. Please see specific information pulled into the AVS if appropriate.         Patient or patient representative verbalized consent for the use of Ambient Listening during the visit with  Yonis Conte DO for chart documentation. 10/22/2024  14:36 EDT

## 2025-02-04 ENCOUNTER — LAB (OUTPATIENT)
Facility: HOSPITAL | Age: 55
End: 2025-02-04
Payer: MEDICARE

## 2025-02-04 ENCOUNTER — OFFICE VISIT (OUTPATIENT)
Dept: PULMONOLOGY | Facility: CLINIC | Age: 55
End: 2025-02-04
Payer: MEDICARE

## 2025-02-04 VITALS
SYSTOLIC BLOOD PRESSURE: 173 MMHG | TEMPERATURE: 97.8 F | OXYGEN SATURATION: 98 % | HEART RATE: 86 BPM | RESPIRATION RATE: 18 BRPM | WEIGHT: 293 LBS | HEIGHT: 61 IN | BODY MASS INDEX: 55.32 KG/M2 | DIASTOLIC BLOOD PRESSURE: 96 MMHG

## 2025-02-04 DIAGNOSIS — J45.20 MILD INTERMITTENT ASTHMA, UNSPECIFIED WHETHER COMPLICATED: ICD-10-CM

## 2025-02-04 DIAGNOSIS — R00.2 HEART PALPITATIONS: ICD-10-CM

## 2025-02-04 DIAGNOSIS — R06.09 POST-COVID CHRONIC DYSPNEA: Primary | ICD-10-CM

## 2025-02-04 DIAGNOSIS — U09.9 POST-COVID CHRONIC DYSPNEA: Primary | ICD-10-CM

## 2025-02-04 LAB
BASOPHILS # BLD AUTO: 0.05 10*3/MM3 (ref 0–0.2)
BASOPHILS NFR BLD AUTO: 0.4 % (ref 0–1.5)
DEPRECATED RDW RBC AUTO: 39.1 FL (ref 37–54)
EOSINOPHIL # BLD AUTO: 0.09 10*3/MM3 (ref 0–0.4)
EOSINOPHIL NFR BLD AUTO: 0.8 % (ref 0.3–6.2)
ERYTHROCYTE [DISTWIDTH] IN BLOOD BY AUTOMATED COUNT: 13 % (ref 12.3–15.4)
HCT VFR BLD AUTO: 40.4 % (ref 34–46.6)
HGB BLD-MCNC: 13.6 G/DL (ref 12–15.9)
IMM GRANULOCYTES # BLD AUTO: 0.06 10*3/MM3 (ref 0–0.05)
IMM GRANULOCYTES NFR BLD AUTO: 0.5 % (ref 0–0.5)
LYMPHOCYTES # BLD AUTO: 3.17 10*3/MM3 (ref 0.7–3.1)
LYMPHOCYTES NFR BLD AUTO: 27.5 % (ref 19.6–45.3)
MCH RBC QN AUTO: 27.9 PG (ref 26.6–33)
MCHC RBC AUTO-ENTMCNC: 33.7 G/DL (ref 31.5–35.7)
MCV RBC AUTO: 82.8 FL (ref 79–97)
MONOCYTES # BLD AUTO: 0.62 10*3/MM3 (ref 0.1–0.9)
MONOCYTES NFR BLD AUTO: 5.4 % (ref 5–12)
NEUTROPHILS NFR BLD AUTO: 65.4 % (ref 42.7–76)
NEUTROPHILS NFR BLD AUTO: 7.53 10*3/MM3 (ref 1.7–7)
NRBC BLD AUTO-RTO: 0 /100 WBC (ref 0–0.2)
PLATELET # BLD AUTO: 222 10*3/MM3 (ref 140–450)
PMV BLD AUTO: 12.7 FL (ref 6–12)
RBC # BLD AUTO: 4.88 10*6/MM3 (ref 3.77–5.28)
WBC NRBC COR # BLD AUTO: 11.52 10*3/MM3 (ref 3.4–10.8)

## 2025-02-04 PROCEDURE — 36415 COLL VENOUS BLD VENIPUNCTURE: CPT

## 2025-02-04 PROCEDURE — 82785 ASSAY OF IGE: CPT

## 2025-02-04 PROCEDURE — 85025 COMPLETE CBC W/AUTO DIFF WBC: CPT

## 2025-02-04 RX ORDER — SILVER SULFADIAZINE 10 MG/G
CREAM TOPICAL
COMMUNITY
Start: 2024-10-28

## 2025-02-04 NOTE — PROGRESS NOTES
Chief Complaint  Follow-up (4 month), Post-COVID chronic dyspnea, Asthma, and Cough    Subjective        Stefani Villafana presents to [facility] PULMONARY & CRITICAL CARE MEDICINE  History of Present Illness  History of Present Illness  The patient presents for evaluation of post COVID-19 syndrome, asthma, and HSP.    She reports experiencing cognitive impairment, which she attributes to a previous COVID-19 infection. She engages in puzzle games as a form of mental exercise to mitigate this issue. She was the first COVID-19 patient at her hospital to be placed on a ventilator. She has been participating in therapy since her discharge from the hospital, initially due to survivor's guilt. 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 She expresses concern about potential changes to her disability status and housing subsidies. She also reports muscle pain, which she describes as similar to post-exercise soreness. She feels that barometric pressure affects her symptoms. She has a history of kidney issues post-COVID-19. She is currently on metformin twice daily but reports gastrointestinal intolerance. She is not currently taking Rybelsus. She has not yet consulted an endocrinologist. She reports persistent breathing difficulties, which she does not attribute to her asthma, as she is not experiencing chest tightness or coughing. She reports that her oxygen levels decrease during physical activity, such as walking or using a treadmill. She avoids walking in large stores, opting for a scooter instead. She experiences constant fatigue and shortness of breath, which vary in intensity. She can only speak half a sentence before becoming short of breath. She has a history of bilateral heart catheterization and is currently on metoprolol for tachycardia. She reports recent episodes of  "palpitations and is considering seeking a new cardiologist.    She has a history of asthma, which she believes is well-managed. She reports experiencing breathing difficulties, but does not attribute these to her asthma, as she is not experiencing chest tightness or coughing. She has a history of dry cough, which she manages to prevent emergency room visits. She has a history of PTSD related to ER visits.    She has a history of Henoch-Schonlein purpura (HSP), with residual lesions on her leg, but no recent flare-ups or new purplish-red spots. She is seeking advice on whether she can receive the influenza and COVID-19 vaccines following her HSP diagnosis. She has a history of chickenpox and is interested in receiving the shingles vaccine. She has a history of smallpox vaccination.    MEDICATIONS  Current: Metformin, metoprolol    IMMUNIZATIONS  She has had the smallpox vaccine.    Objective   Vital Signs:  /96 (BP Location: Left arm, Patient Position: Sitting, Cuff Size: Large Adult)   Pulse 86   Temp 97.8 °F (36.6 °C) (Temporal)   Resp 18   Ht 154.9 cm (61\")   Wt (!) 147 kg (323 lb)   SpO2 98% Comment: room air  BMI 61.03 kg/m²   Estimated body mass index is 61.03 kg/m² as calculated from the following:    Height as of this encounter: 154.9 cm (61\").    Weight as of this encounter: 147 kg (323 lb).          Physical Exam   Physical Exam  Pleasant female  Resting comfortably  Lungs are clear    Result Review :         Results                Assessment and Plan   Diagnoses and all orders for this visit:    1. Post-COVID chronic dyspnea (Primary)  -     Complete PFT - Pre & Post Bronchodilator; Future  -     6 Minute Walk Test; Future    2. Mild intermittent asthma, unspecified whether complicated    3. Heart palpitations  -     Ambulatory Referral to Cardiology    Other orders  -     Fluzone >6mos ([id number])      Assessment & Plan  1. Post COVID-19 syndrome.  She reports ongoing symptoms of shortness of " breath, fatigue, and brain fog since her COVID-19 infection.   Investigational treatment into post COVID syndrome has been discussed with the patient including the following metformin  She has been on metformin twice a day but experiences gastrointestinal intolerance. The potential benefits of GLP-1 agonists for post-COVID-19 syndrome were discussed. A 6-minute walk test will be conducted to assess her oxygen levels during physical activity. She is advised to discuss the possibility of starting a GLP-1 agonist with her primary care physician.    2. Asthma.  Her asthma is currently well-controlled. She experiences occasional dry cough but does not have chest tightness or significant asthma symptoms. She is advised to continue her current asthma management plan and monitor for any changes in symptoms.    3. Henoch-Schonlein purpura (HSP).  She has not had a flare-up of HSP in over a year but still has residual spots on her legs. She is cleared to receive the influenza and COVID-19 vaccines. She is advised to get the influenza vaccine as soon as possible and to check for the availability of the new COVID-19 vaccine expected in October.    4. Anxiety.  She reports high stress levels due to political and financial concerns, including fears of losing disability benefits and housing subsidies. She is encouraged to continue her therapy sessions to manage anxiety and stress.    5 heart palpitations  Referral for cardiology given persistent symptoms    PROCEDURE  The patient has a history of bilateral heart catheterization.  No evidence of left-sided disease and no evidence of pulmonary pretension         Follow Up   Return in about 4 months (around 6/4/2025).  Patient was given instructions and counseling regarding her condition or for health maintenance advice. Please see specific information pulled into the AVS if appropriate.         Patient or patient representative verbalized consent for the use of Ambient Listening  during the visit with  Yonis Conte DO for chart documentation. 2/4/2025  14:18 EST

## 2025-02-07 LAB — IGE SERPL-ACNC: 26 IU/ML (ref 6–495)

## 2025-04-15 ENCOUNTER — HOSPITAL ENCOUNTER (OUTPATIENT)
Dept: RESPIRATORY THERAPY | Facility: HOSPITAL | Age: 55
Discharge: HOME | End: 2025-04-15
Payer: MEDICARE

## 2025-04-15 DIAGNOSIS — U09.9 POST-COVID CHRONIC DYSPNEA: ICD-10-CM

## 2025-04-15 DIAGNOSIS — R06.09 POST-COVID CHRONIC DYSPNEA: ICD-10-CM

## 2025-04-15 PROCEDURE — 94726 PLETHYSMOGRAPHY LUNG VOLUMES: CPT

## 2025-04-15 PROCEDURE — 94618 PULMONARY STRESS TESTING: CPT

## 2025-04-15 PROCEDURE — 94060 EVALUATION OF WHEEZING: CPT

## 2025-04-15 PROCEDURE — 94729 DIFFUSING CAPACITY: CPT

## 2025-04-15 RX ORDER — ALBUTEROL SULFATE 0.83 MG/ML
2.5 SOLUTION RESPIRATORY (INHALATION) ONCE
Status: COMPLETED | OUTPATIENT
Start: 2025-04-15 | End: 2025-04-15

## 2025-04-15 RX ADMIN — ALBUTEROL SULFATE 2.5 MG: 2.5 SOLUTION RESPIRATORY (INHALATION) at 12:52

## 2025-04-15 NOTE — PROGRESS NOTES
Exercise Oximetry    Patient Name:Stefani Villafana   MRN: [id number]   Date: 04/15/25             ROOM AIR BASELINE   SpO2% 97   Heart Rate 86   Blood Pressure 149/90     EXERCISE ON ROOM AIR SpO2% EXERCISE ON O2 @  LPM SpO2%   1 MINUTE 96 1 MINUTE    2 MINUTES 95 2 MINUTES    3 MINUTES 96 3 MINUTES    4 MINUTES 96 4 MINUTES    5 MINUTES 95 5 MINUTES    6 MINUTES 96 6 MINUTES               Distance Walked  500 ft Distance Walked   Dyspnea (Jeremy Scale)  7 Dyspnea (Jeremy Scale)   Fatigue (Jeremy Scale)  6 Fatigue (Jeremy Scale)   SpO2% Post Exercise  96 SpO2% Post Exercise   HR Post Exercise  114 HR Post Exercise   Time to Recovery   Time to Recovery     Comments:

## 2025-07-04 ENCOUNTER — HOSPITAL ENCOUNTER (EMERGENCY)
Facility: HOSPITAL | Age: 55
Discharge: HOME | End: 2025-07-05
Attending: EMERGENCY MEDICINE
Payer: MEDICARE

## 2025-07-04 DIAGNOSIS — F41.0 PANIC ATTACK: Primary | ICD-10-CM

## 2025-07-04 LAB
ALBUMIN SERPL-MCNC: 4.7 G/DL (ref 3.5–5.2)
ALBUMIN/GLOB SERPL: 1.3 G/DL
ALP SERPL-CCNC: 125 U/L (ref 39–117)
ALT SERPL W P-5'-P-CCNC: 18 U/L (ref 1–33)
ANION GAP SERPL CALCULATED.3IONS-SCNC: 20.6 MMOL/L (ref 5–15)
AST SERPL-CCNC: 16 U/L (ref 1–32)
BASOPHILS # BLD AUTO: 0.05 10*3/MM3 (ref 0–0.2)
BASOPHILS NFR BLD AUTO: 0.4 % (ref 0–1.5)
BILIRUB SERPL-MCNC: 0.7 MG/DL (ref 0–1.2)
BUN SERPL-MCNC: 15.7 MG/DL (ref 6–20)
BUN/CREAT SERPL: 10.9 (ref 7–25)
CALCIUM SPEC-SCNC: 10.1 MG/DL (ref 8.6–10.5)
CHLORIDE SERPL-SCNC: 98 MMOL/L (ref 98–107)
CO2 SERPL-SCNC: 20.4 MMOL/L (ref 22–29)
CREAT SERPL-MCNC: 1.44 MG/DL (ref 0.57–1)
DEPRECATED RDW RBC AUTO: 39.5 FL (ref 37–54)
EGFRCR SERPLBLD CKD-EPI 2021: 43 ML/MIN/1.73
EOSINOPHIL # BLD AUTO: 0.08 10*3/MM3 (ref 0–0.4)
EOSINOPHIL NFR BLD AUTO: 0.6 % (ref 0.3–6.2)
ERYTHROCYTE [DISTWIDTH] IN BLOOD BY AUTOMATED COUNT: 13.1 % (ref 12.3–15.4)
GEN 5 1HR TROPONIN T REFLEX: 7 NG/L
GLOBULIN UR ELPH-MCNC: 3.6 GM/DL
GLUCOSE SERPL-MCNC: 190 MG/DL (ref 65–99)
HCT VFR BLD AUTO: 45.1 % (ref 34–46.6)
HGB BLD-MCNC: 14.8 G/DL (ref 12–15.9)
HOLD SPECIMEN: NORMAL
HOLD SPECIMEN: NORMAL
IMM GRANULOCYTES # BLD AUTO: 0.04 10*3/MM3 (ref 0–0.05)
IMM GRANULOCYTES NFR BLD AUTO: 0.3 % (ref 0–0.5)
LYMPHOCYTES # BLD AUTO: 3.57 10*3/MM3 (ref 0.7–3.1)
LYMPHOCYTES NFR BLD AUTO: 27.4 % (ref 19.6–45.3)
MCH RBC QN AUTO: 27.6 PG (ref 26.6–33)
MCHC RBC AUTO-ENTMCNC: 32.8 G/DL (ref 31.5–35.7)
MCV RBC AUTO: 84.1 FL (ref 79–97)
MONOCYTES # BLD AUTO: 0.66 10*3/MM3 (ref 0.1–0.9)
MONOCYTES NFR BLD AUTO: 5.1 % (ref 5–12)
NEUTROPHILS NFR BLD AUTO: 66.2 % (ref 42.7–76)
NEUTROPHILS NFR BLD AUTO: 8.62 10*3/MM3 (ref 1.7–7)
NRBC BLD AUTO-RTO: 0 /100 WBC (ref 0–0.2)
NT-PROBNP SERPL-MCNC: 94.2 PG/ML (ref 0–900)
PLATELET # BLD AUTO: 241 10*3/MM3 (ref 140–450)
PMV BLD AUTO: 12.5 FL (ref 6–12)
POTASSIUM SERPL-SCNC: 3.7 MMOL/L (ref 3.5–5.2)
PROT SERPL-MCNC: 8.3 G/DL (ref 6–8.5)
QT INTERVAL: 381 MS
QTC INTERVAL: 485 MS
RBC # BLD AUTO: 5.36 10*6/MM3 (ref 3.77–5.28)
SODIUM SERPL-SCNC: 139 MMOL/L (ref 136–145)
TROPONIN T NUMERIC DELTA: 0 NG/L
TROPONIN T SERPL HS-MCNC: 7 NG/L
TSH SERPL DL<=0.05 MIU/L-ACNC: 3.82 UIU/ML (ref 0.27–4.2)
WBC NRBC COR # BLD AUTO: 13.02 10*3/MM3 (ref 3.4–10.8)
WHOLE BLOOD HOLD COAG: NORMAL
WHOLE BLOOD HOLD SPECIMEN: NORMAL

## 2025-07-04 PROCEDURE — 84484 ASSAY OF TROPONIN QUANT: CPT | Performed by: EMERGENCY MEDICINE

## 2025-07-04 PROCEDURE — 93005 ELECTROCARDIOGRAM TRACING: CPT | Performed by: EMERGENCY MEDICINE

## 2025-07-04 PROCEDURE — 36415 COLL VENOUS BLD VENIPUNCTURE: CPT

## 2025-07-04 PROCEDURE — 85025 COMPLETE CBC W/AUTO DIFF WBC: CPT | Performed by: EMERGENCY MEDICINE

## 2025-07-04 PROCEDURE — 84443 ASSAY THYROID STIM HORMONE: CPT

## 2025-07-04 PROCEDURE — 80053 COMPREHEN METABOLIC PANEL: CPT | Performed by: EMERGENCY MEDICINE

## 2025-07-04 PROCEDURE — 99283 EMERGENCY DEPT VISIT LOW MDM: CPT

## 2025-07-04 PROCEDURE — 83880 ASSAY OF NATRIURETIC PEPTIDE: CPT

## 2025-07-04 RX ORDER — LORAZEPAM 0.5 MG/1
1 TABLET ORAL ONCE
Status: COMPLETED | OUTPATIENT
Start: 2025-07-04 | End: 2025-07-04

## 2025-07-04 RX ADMIN — LORAZEPAM 1 MG: 0.5 TABLET ORAL at 22:46

## 2025-07-05 VITALS
SYSTOLIC BLOOD PRESSURE: 122 MMHG | HEART RATE: 67 BPM | HEIGHT: 61 IN | RESPIRATION RATE: 26 BRPM | WEIGHT: 293 LBS | BODY MASS INDEX: 55.32 KG/M2 | DIASTOLIC BLOOD PRESSURE: 64 MMHG | OXYGEN SATURATION: 96 % | TEMPERATURE: 97.8 F

## 2025-07-05 LAB
ANION GAP SERPL CALCULATED.3IONS-SCNC: 15.8 MMOL/L (ref 5–15)
BUN SERPL-MCNC: 16 MG/DL (ref 6–20)
BUN/CREAT SERPL: 13 (ref 7–25)
CALCIUM SPEC-SCNC: 9.6 MG/DL (ref 8.6–10.5)
CHLORIDE SERPL-SCNC: 102 MMOL/L (ref 98–107)
CO2 SERPL-SCNC: 23.2 MMOL/L (ref 22–29)
CREAT SERPL-MCNC: 1.23 MG/DL (ref 0.57–1)
D-LACTATE SERPL-SCNC: 1.8 MMOL/L (ref 0.5–2)
EGFRCR SERPLBLD CKD-EPI 2021: 52 ML/MIN/1.73
GLUCOSE SERPL-MCNC: 138 MG/DL (ref 65–99)
HOLD SPECIMEN: NORMAL
POTASSIUM SERPL-SCNC: 3.5 MMOL/L (ref 3.5–5.2)
SODIUM SERPL-SCNC: 141 MMOL/L (ref 136–145)

## 2025-07-05 PROCEDURE — 96360 HYDRATION IV INFUSION INIT: CPT

## 2025-07-05 PROCEDURE — 25810000003 SODIUM CHLORIDE 0.9 % SOLUTION

## 2025-07-05 PROCEDURE — 83605 ASSAY OF LACTIC ACID: CPT

## 2025-07-05 PROCEDURE — 80048 BASIC METABOLIC PNL TOTAL CA: CPT

## 2025-07-05 RX ADMIN — SODIUM CHLORIDE 1000 ML: 9 INJECTION, SOLUTION INTRAVENOUS at 01:43

## 2025-07-05 NOTE — ED PROVIDER NOTES
"Time: 6:09 AM EDT  Date of encounter:  7/4/2025  Independent Historian/Clinical History and Information was obtained by:   Patient    History is limited by: N/A    Chief Complaint: Palpitations      History of Present Illness:  Patient is a 55 y.o. year old female who presents to the emergency department for evaluation of palpitations and rapid heart rate x 1 day.  Patient reports a prior medical history of PTSD and panic attacks.  Patient reports she began to experience this rapid heart rate earlier today.  Denies syncope or near syncope.  Reports no known skipping of the beat sensation.  Denies history of A-fib or a flutter.  Denies history of MI or CVA.  Denies history of DVT or PE.  Patient reports she did recently take THC Gummies with worsening anxiety after taking.      Patient Care Team  Primary Care Provider: Mario Al APRN    Past Medical History:     Allergies   Allergen Reactions    Demerol [Meperidine] Shortness Of Breath    Famotidine Anaphylaxis, Angioedema and Hives     Other reaction(s): anaphylaxis   pt rec'd \"gi cocktail\"    Other reaction(s): Angioedema (ALLERGY/intolerance)    Latex Rash and Swelling     Latex gloves caused eye swelling    Other Anaphylaxis     pt rec'd \"gi cocktail\"    Phenobarbital-Belladonna Alk Anaphylaxis    Hydrocodone Other (See Comments)     Suicidal       Hydrocodone-Acetaminophen Other (See Comments)     Category: Allergy;    Prednisone Other (See Comments)     Hypertenson; joint pain        Trazodone Other (See Comments)     Night terrors    Vancomycin Other (See Comments)     Red dejah syndrome      Past Medical History:   Diagnosis Date    Arthritis     Asthma     COVID-19 long hauler     2 years    Hypertension     Hypothyroidism     Sleep apnea      Past Surgical History:   Procedure Laterality Date    CARDIAC CATHETERIZATION       History reviewed. No pertinent family history.    Home Medications:  Prior to Admission medications    Medication Sig Start " Date End Date Taking? Authorizing Provider   acetaminophen (TYLENOL) 325 MG tablet Take 2 tablets by mouth Every 6 (Six) Hours As Needed.    Leanna Gusman MD   acetaminophen (TYLENOL) 500 MG tablet Take  by mouth Every 6 (Six) Hours As Needed.    Leanna Gusman MD   albuterol (PROVENTIL) (2.5 MG/3ML) 0.083% nebulizer solution Take 2.5 mg by nebulization Every 4 (Four) Hours As Needed for Wheezing.  Patient not taking: Reported on 2/4/2025    Leanna Gusman MD   azelastine (OPTIVAR) 0.05 % ophthalmic solution Every 12 (Twelve) Hours.  Patient not taking: Reported on 2/4/2025    Leanna Gusman MD   Biotin 10 MG capsule Take  by mouth Daily.  Patient not taking: Reported on 2/4/2025    Leanna Gusman MD   budesonide-formoterol (SYMBICORT) 80-4.5 MCG/ACT inhaler Inhale 2 puffs 2 (Two) Times a Day.  Patient not taking: Reported on 2/4/2025    Leanna Gusman MD   budesonide-formoterol (SYMBICORT) 80-4.5 MCG/ACT inhaler Inhale 2 puffs 2 (Two) Times a Day. 10/22/24   Yonis Conte,    cetirizine (zyrTEC) 10 MG tablet Take 1 tablet by mouth Daily.  Patient not taking: Reported on 2/4/2025    Leanna Gusman MD   diphenhydrAMINE (BENADRYL) 25 mg capsule Take 1 capsule by mouth.    Leanna Gusman MD   esomeprazole (nexIUM) 20 MG capsule Take 1 capsule by mouth Daily.  Patient not taking: Reported on 2/4/2025    Leanna Gusman MD   fexofenadine (Allegra Allergy) 180 MG tablet Take 1 tablet by mouth Daily.  Patient not taking: Reported on 2/4/2025    Leanna Gusman MD   fluticasone (Flonase Allergy Relief) 50 MCG/ACT nasal spray Daily.    Leanna Gusman MD   gabapentin (NEURONTIN) 300 MG capsule Take 1 capsule by mouth. 9/26/24   Leanna Gusman MD   hydroCHLOROthiazide (HYDRODIURIL) 50 MG tablet Take 1 tablet by mouth Daily.    Leanna Gusman MD   hydrOXYzine (ATARAX) 25 MG tablet Take 1 tablet by mouth 3 (Three) Times a Day  As Needed for Itching.    Leanna Gusman MD   ketorolac (ACULAR) 0.4 % solution Every 6 (Six) Hours.  Patient not taking: Reported on 2/4/2025    Leanna Gusman MD   lansoprazole (PREVACID) 30 MG capsule TAKE 1 CAPSULE (30 MG TOTAL) BY MOUTH 1 (ONE) TIME EACH DAY BEFORE BREAKFAST.    Leanna Gusman MD   levocetirizine (XYZAL) 5 MG tablet Take 1 tablet by mouth Daily.  Patient not taking: Reported on 2/4/2025    Leanna Gusman MD   levothyroxine (SYNTHROID, LEVOTHROID) 75 MCG tablet Take 1 tablet by mouth Daily.    Leanna Gusman MD   Lidocaine Viscous HCl (XYLOCAINE) 2 % solution Gargle throat with 7.5 ml of lidocaine every 4 hrs as needed for sore throat  Patient not taking: Reported on 2/4/2025 6/14/24   Leanna Gusman MD   loratadine (Claritin) 10 MG tablet Take 1 tablet by mouth Daily.    Leanna Gusman MD   metFORMIN ER (GLUCOPHAGE-XR) 500 MG 24 hr tablet Take 1 tablet by mouth. 8/12/24   Leanna Gusman MD   metoprolol succinate XL (TOPROL-XL) 25 MG 24 hr tablet Take 1 tablet by mouth Daily.    Leanna Gusman MD   montelukast (SINGULAIR) 10 MG tablet Take 1 tablet by mouth Every Night.    Leanna Gusman MD   multivitamin tablet tablet Take 1 tablet by mouth Daily.    Leanna Gusman MD   promethazine-dextromethorphan (PROMETHAZINE-DM) 6.25-15 MG/5ML syrup TAKE 5 MILLILITERS BY MOUTH 4 TIMES A DAY AS NEEDED FOR COUGH FOR UP TO 7 DAYS  Patient not taking: Reported on 2/4/2025 7/29/24   Leanna Gusman MD   saccharomyces boulardii (FLORASTOR) 250 MG capsule Take 1 capsule by mouth 2 (Two) Times a Day.  Patient not taking: Reported on 2/4/2025    Leanna Gusman MD   silver sulfadiazine (SILVADENE, SSD) 1 % cream APPLY TO AFFECTED AREA TWICE A DAY OR WITH EACH DRESSING CHANGE.  Patient not taking: Reported on 2/4/2025 10/28/24   Leanna Gusman MD   sodium chloride 0.65 % nasal spray Administer 1 spray into the nostril(s)  "as directed by provider.    Leanna Gusman MD   sodium chloride 0.65 % nasal spray Administer 1 spray into the nostril(s) as directed by provider.  Patient not taking: Reported on 2/4/2025    Leanna Gusman MD   spironolactone (ALDACTONE) 50 MG tablet Take 1 tablet by mouth Daily.    Leanna Gusman MD        Social History:   Social History     Tobacco Use    Smoking status: Never     Passive exposure: Past    Smokeless tobacco: Never   Vaping Use    Vaping status: Never Used   Substance Use Topics    Alcohol use: Not Currently    Drug use: Not Currently     Comment: cbd gummies         Review of Systems:  Review of Systems     Physical Exam:  /64   Pulse 67   Temp 97.8 °F (36.6 °C) (Oral)   Resp 26   Ht 154.9 cm (61\")   Wt (!) 141 kg (311 lb 1.1 oz)   SpO2 96%   BMI 58.78 kg/m²     Physical Exam  Constitutional:       Appearance: She is obese. She is diaphoretic.   HENT:      Head: Normocephalic.      Mouth/Throat:      Mouth: Mucous membranes are moist.   Eyes:      Pupils: Pupils are equal, round, and reactive to light.   Cardiovascular:      Rate and Rhythm: Regular rhythm. Tachycardia present.   Pulmonary:      Effort: Pulmonary effort is normal.   Abdominal:      General: There is no distension.   Musculoskeletal:      Cervical back: Neck supple.   Skin:     General: Skin is warm.   Neurological:      General: No focal deficit present.      Mental Status: She is alert and oriented to person, place, and time.   Psychiatric:         Mood and Affect: Mood normal.         Behavior: Behavior normal.                    Medical Decision Making:      Comorbidities that affect care:    Obesity    External Notes reviewed:    Previous Clinic Note: Previous clinic note on 6/3/2025 reviewed.  Patient was seen for type 2 diabetes mellitus      The following orders were placed and all results were independently analyzed by me:  Orders Placed This Encounter   Procedures    Comprehensive " Metabolic Panel    [place] Draw    High Sensitivity Troponin T    CBC Auto Differential    TSH Rfx On Abnormal To Free T4    BNP    High Sensitivity Troponin T 1Hr    Lactic Acid, Plasma    Basic Metabolic Panel    ECG 12 Lead Chest Pain    CBC & Differential    Green Top (Gel)    Lavender Top    Gold Top - SST    Light Blue Top    Extra Tubes    Green Top (Gel)       Medications Given in the Emergency Department:  Medications   LORazepam (ATIVAN) tablet 1 mg (1 mg Oral Given 7/4/25 1246)   sodium chloride 0.9 % bolus 1,000 mL (0 mL Intravenous Stopped 7/5/25 0413)        ED Course:    ED Course as of 07/05/25 0614   Sat Jul 05, 2025 0227 CBC reviewed.  Evidence of a leukocytosis of 13.  No other acute findings. [CB]   0228 CMP reviewed.  Evidence of an elevated creatinine of 1.44 patient's baseline around 1.  No evidence of acute MIGUEL.  Will administer 1 L fluid bolus    Evidence of a decreased bicarb of 20 with an anion gap of 20. [CB]   0228 TSH reviewed.  No acute findings.  Initial and repeat troponins reviewed.  No acute findings.  No evidence of ACS or MI.  Patient's EKG reveals a sinus rhythm. [CB]   0400 BMP reviewed.  Evidence of an improvement at bedside about the creatinine and anion gap.  Patient's bicarb is now elevated to normal levels.  This is appropriate for discharge as there is no evidence of lactic acidosis or other suspected derangements related to the patient's anion gap. [CB]      ED Course User Index  [CB] Uriah Smith, JOI       Labs:    Lab Results (last 24 hours)       Procedure Component Value Units Date/Time    CBC & Differential [[id number]]  (Abnormal) Collected: 07/04/25 2203    Specimen: Blood Updated: 07/04/25 2209    Narrative:      The following orders were created for panel order CBC & Differential.  Procedure                               Abnormality         Status                     ---------                               -----------         ------                      CBC Auto Differential[[id number]]        Abnormal            Final result                 Please view results for these tests on the individual orders.    Comprehensive Metabolic Panel [[id number]]  (Abnormal) Collected: 07/04/25 2203    Specimen: Blood Updated: 07/04/25 2232     Glucose 190 mg/dL      BUN 15.7 mg/dL      Creatinine 1.44 mg/dL      Sodium 139 mmol/L      Potassium 3.7 mmol/L      Chloride 98 mmol/L      CO2 20.4 mmol/L      Calcium 10.1 mg/dL      Total Protein 8.3 g/dL      Albumin 4.7 g/dL      ALT (SGPT) 18 U/L      AST (SGOT) 16 U/L      Alkaline Phosphatase 125 U/L      Total Bilirubin 0.7 mg/dL      Globulin 3.6 gm/dL      A/G Ratio 1.3 g/dL      BUN/Creatinine Ratio 10.9     Anion Gap 20.6 mmol/L      eGFR 43.0 mL/min/1.73     Narrative:      GFR Categories in Chronic Kidney Disease (CKD)              GFR Category          GFR (mL/min/1.73)    Interpretation  G1                    90 or greater        Normal or high (1)  G2                    60-89                Mild decrease (1)  G3a                   45-59                Mild to moderate decrease  G3b                   30-44                Moderate to severe decrease  G4                    15-29                Severe decrease  G5                    14 or less           Kidney failure    (1)In the absence of evidence of kidney disease, neither GFR category G1 or G2 fulfill the criteria for CKD.    eGFR calculation 2021 CKD-EPI creatinine equation, which does not include race as a factor    High Sensitivity Troponin T [[id number]]  (Normal) Collected: 07/04/25 2203    Specimen: Blood Updated: 07/04/25 2232     HS Troponin T 7 ng/L     Narrative:      High Sensitive Troponin T Reference Range:  <14.0 ng/L- Negative Female for AMI  <22.0 ng/L- Negative Male for AMI  >=14 - Abnormal Female indicating possible myocardial injury.  >=22 - Abnormal Male indicating possible myocardial injury.   Clinicians would have to utilize clinical acumen, EKG,  Troponin, and serial changes to determine if it is an Acute Myocardial Infarction or myocardial injury due to an underlying chronic condition.         CBC Auto Differential [[id number]]  (Abnormal) Collected: 07/04/25 2203    Specimen: Blood Updated: 07/04/25 2209     WBC 13.02 10*3/mm3      RBC 5.36 10*6/mm3      Hemoglobin 14.8 g/dL      Hematocrit 45.1 %      MCV 84.1 fL      MCH 27.6 pg      MCHC 32.8 g/dL      RDW 13.1 %      RDW-SD 39.5 fl      MPV 12.5 fL      Platelets 241 10*3/mm3      Neutrophil % 66.2 %      Lymphocyte % 27.4 %      Monocyte % 5.1 %      Eosinophil % 0.6 %      Basophil % 0.4 %      Immature Grans % 0.3 %      Neutrophils, Absolute 8.62 10*3/mm3      Lymphocytes, Absolute 3.57 10*3/mm3      Monocytes, Absolute 0.66 10*3/mm3      Eosinophils, Absolute 0.08 10*3/mm3      Basophils, Absolute 0.05 10*3/mm3      Immature Grans, Absolute 0.04 10*3/mm3      nRBC 0.0 /100 WBC     TSH Rfx On Abnormal To Free T4 [[id number]]  (Normal) Collected: 07/04/25 2203    Specimen: Blood Updated: 07/04/25 2251     TSH 3.820 uIU/mL     BNP [[id number]]  (Normal) Collected: 07/04/25 2203    Specimen: Blood Updated: 07/04/25 2251     proBNP 94.2 pg/mL     Narrative:      This assay is used as an aid in the diagnosis of individuals suspected of having heart failure. It can be used as an aid in the diagnosis of acute decompensated heart failure (ADHF) in patients presenting with signs and symptoms of ADHF to the emergency department (ED). In addition, NT-proBNP of <300 pg/mL indicates ADHF is not likely.    Age Range Result Interpretation  NT-proBNP Concentration (pg/mL:      <50             Positive            >450                   Gray                 300-450                    Negative             <300    50-75           Positive            >900                  Gray                300-900                  Negative            <300      >75             Positive            >1800                  Edgar                 300-1800                  Negative            <300    High Sensitivity Troponin T 1Hr [[id number]]  (Normal) Collected: 07/04/25 2338    Specimen: Blood Updated: 07/04/25 2359     HS Troponin T 7 ng/L      Troponin T Numeric Delta 0 ng/L     Narrative:      High Sensitive Troponin T Reference Range:  <14.0 ng/L- Negative Female for AMI  <22.0 ng/L- Negative Male for AMI  >=14 - Abnormal Female indicating possible myocardial injury.  >=22 - Abnormal Male indicating possible myocardial injury.   Clinicians would have to utilize clinical acumen, EKG, Troponin, and serial changes to determine if it is an Acute Myocardial Infarction or myocardial injury due to an underlying chronic condition.         Lactic Acid, Plasma [[id number]]  (Normal) Collected: 07/05/25 0150    Specimen: Blood Updated: 07/05/25 0218     Lactate 1.8 mmol/L     Basic Metabolic Panel [[id number]]  (Abnormal) Collected: 07/05/25 0337    Specimen: Blood from Hand, Right Updated: 07/05/25 0359     Glucose 138 mg/dL      BUN 16.0 mg/dL      Creatinine 1.23 mg/dL      Sodium 141 mmol/L      Potassium 3.5 mmol/L      Chloride 102 mmol/L      CO2 23.2 mmol/L      Calcium 9.6 mg/dL      BUN/Creatinine Ratio 13.0     Anion Gap 15.8 mmol/L      eGFR 52.0 mL/min/1.73     Narrative:      GFR Categories in Chronic Kidney Disease (CKD)              GFR Category          GFR (mL/min/1.73)    Interpretation  G1                    90 or greater        Normal or high (1)  G2                    60-89                Mild decrease (1)  G3a                   45-59                Mild to moderate decrease  G3b                   30-44                Moderate to severe decrease  G4                    15-29                Severe decrease  G5                    14 or less           Kidney failure    (1)In the absence of evidence of kidney disease, neither GFR category G1 or G2 fulfill the criteria for CKD.    eGFR calculation 2021 CKD-EPI creatinine equation, which does not  include race as a factor             Imaging:    No Radiology Exams Resulted Within Past 24 Hours      Differential Diagnosis and Discussion:    Palpitations: Differential diagnosis includes but is not limited to anxiety, atrioventricular blocks, mitral valve disease, hypoxia, coronary artery disease, hypokalemia, anemia, fever, COPD, congestive heart failure, pericarditis, Jared-Parkinson-White syndrome, pulmonary embolism, SVT, atrial fibrillation, atrial flutter, sinus tachycardia, thyrotoxicosis, and pheochromocytoma.    PROCEDURES:    Labs were collected in the emergency department and all labs were reviewed and interpreted by me.  An EKG was performed and the EKG was interpreted by me.    ECG 12 Lead Chest Pain   Preliminary Result   HEART RATE=97  bpm   RR Rwvoeckw=028  ms   MN Lkexapps=327  ms   P Horizontal Axis=1  deg   P Front Axis=43  deg   QRSD Interval=82  ms   QT Sgdoecsf=603  ms   KBoT=081  ms   QRS Axis=47  deg   T Wave Axis=3  deg   - ABNORMAL ECG -   Sinus rhythm   Low voltage, precordial leads   Nonspecific T abnrm, anterolateral leads   Date and Time of Study:2025-07-04 22:08:21      EKG reviewed:  Patient is in a sinus rhythm with a heart rate of 97 bpm.  Axis within normal limit.  Intervals within normal limit.    Procedures    MDM     Amount and/or Complexity of Data Reviewed  Clinical lab tests: reviewed  Tests in the medicine section of CPT®: reviewed  Decide to obtain previous medical records or to obtain history from someone other than the patient: yes    In summary, patient is a 55-year-old female presenting today secondary to rapid heart rate and reported palpitations.    Patient's vital signs today revealed evidence of intermittent tachycardia and tachypnea.  Patient did not meet SIRS criteria, however, patient did not meet sepsis criteria today as there was no evidence of acute infection with findings today consistent for anxiety.  Patient's initial blood work today revealed evidence  of a creatinine bump of 1.44 and evidence of an anion gap.  Patient's initial lactic was within normal normal limits.  Low suspicion for any other acute causes of anion gap metabolic acidosis.  Patient is currently on metformin.  Patient exhibited no evidence of thyroid storm, MI, ACS, DVT, PE.  Patient was administered a 1 mg tablet of lorazepam with significant improvement at bedside of overall clinical picture and vitals.  Patient is feeling much better with symptoms resolving.  Repeat BMP reveals evidence of improvement of both creatinine and anion gap with bicarb within normal limits.  I discussed the findings with the patient today consistent with anxiety attack.  I discussed return precautions with the patient.  Discussed follow-up with both PCP and behavioral health specialist.  Patient voiced understanding agreement at this time                  Patient Care Considerations:    SEPSIS IS NOT PRESENT IN THE EMERGENCY DEPARTMENT: Patient meets SIRS criteria in the emergency department however the patient does not have a known source of bacterial infection to confirm the diagnosis of sepsis.   ANTIBIOTICS: I considered prescribing antibiotics as an outpatient however no bacterial focus of infection was found.      Consultants/Shared Management Plan:    SHARED VISIT: I have discussed the case with my supervising physician, Dr. Obando who states agrees with treatment plan. The substantive portion of the medical decision was made by the attesting physician who made or approve the management plan and will take responsibility for the patient.  Clinical findings were discussed and ultimate disposition was made in consult with supervising physician.    Social Determinants of Health:    Patient is independent, reliable, and has access to care.       Disposition and Care Coordination:    Discharged: I considered escalation of care by admitting this patient to the hospital, however patient does not meet sepsis criteria.   Does meet SIRS    I have explained the patient´s condition, diagnoses and treatment plan based on the information available to me at this time. I have answered questions and addressed any concerns. The patient has a good  understanding of the patient´s diagnosis, condition, and treatment plan as can be expected at this point. The vital signs have been stable. The patient´s condition is stable and appropriate for discharge from the emergency department.      The patient will pursue further outpatient evaluation with the primary care physician or other designated or consulting physician as outlined in the discharge instructions. They are agreeable to this plan of care and follow-up instructions have been explained in detail. The patient has received these instructions in written format and has expressed an understanding of the discharge instructions. The patient is aware that any significant change in condition or worsening of symptoms should prompt an immediate return to this or the closest emergency department or call to 911.  I have explained discharge medications and the need for follow up with the patient/caretakers. This was also printed in the discharge instructions. Patient was discharged with the following medications and follow up:      Medication List      No changes were made to your prescriptions during this visit.      Mario Al L, APRN  [address] , [facility] [address]  [facility] [address]  [phone]    Schedule an appointment as soon as possible for a visit          Final diagnoses:   Panic attack        ED Disposition       ED Disposition   Discharge    Condition   Stable    Comment   --               This medical record created using voice recognition software.             Uriah Smith, JOI  07/05/25 0614

## 2025-07-05 NOTE — ED PROVIDER NOTES
"SHARED VISIT ATTESTATION:    This visit was performed by myself and an APC.  I personally approved the management plan/medical decision making and take responsibility for the patient management.      SHARED VISIT NOTE:    Patient is 55 y.o. year old female that presents to the ED for evaluation of anxiety and palpitation.     Physical Exam    ED Course:    BP (!) 171/101   Pulse 100   Ht 154.9 cm (61\")   Wt (!) 141 kg (311 lb 1.1 oz)   SpO2 94%   BMI 58.78 kg/m²       The following orders were placed and all results were independently analyzed by me:  Orders Placed This Encounter   Procedures   • Comprehensive Metabolic Panel   • [place] Draw   • High Sensitivity Troponin T   • CBC Auto Differential   • TSH Rfx On Abnormal To Free T4   • BNP   • High Sensitivity Troponin T 1Hr   • ECG 12 Lead Chest Pain   • CBC & Differential   • Green Top (Gel)   • Lavender Top   • Gold Top - SST   • Light Blue Top       Medications Given in the Emergency Department:  Medications   LORazepam (ATIVAN) tablet 1 mg (has no administration in time range)        ED Course:    ED Course as of 07/05/25 0719   Sat Jul 05, 2025 0227 CBC reviewed.  Evidence of a leukocytosis of 13.  No other acute findings. [CB]   0228 CMP reviewed.  Evidence of an elevated creatinine of 1.44 patient's baseline around 1.  No evidence of acute MIGUEL.  Will administer 1 L fluid bolus    Evidence of a decreased bicarb of 20 with an anion gap of 20. [CB]   0228 TSH reviewed.  No acute findings.  Initial and repeat troponins reviewed.  No acute findings.  No evidence of ACS or MI.  Patient's EKG reveals a sinus rhythm. [CB]   0400 BMP reviewed.  Evidence of an improvement at bedside about the creatinine and anion gap.  Patient's bicarb is now elevated to normal levels.  This is appropriate for discharge as there is no evidence of lactic acidosis or other suspected derangements related to the patient's anion gap. [CB]      ED Course User Index  [CB] Luis, " Uriah WALTON PA-C       Labs:    Lab Results (last 24 hours)       Procedure Component Value Units Date/Time    CBC & Differential [[id number]]  (Abnormal) Collected: 07/04/25 2203    Specimen: Blood Updated: 07/04/25 2209    Narrative:      The following orders were created for panel order CBC & Differential.  Procedure                               Abnormality         Status                     ---------                               -----------         ------                     CBC Auto Differential[[id number]]        Abnormal            Final result                 Please view results for these tests on the individual orders.    Comprehensive Metabolic Panel [[id number]]  (Abnormal) Collected: 07/04/25 2203    Specimen: Blood Updated: 07/04/25 2232     Glucose 190 mg/dL      BUN 15.7 mg/dL      Creatinine 1.44 mg/dL      Sodium 139 mmol/L      Potassium 3.7 mmol/L      Chloride 98 mmol/L      CO2 20.4 mmol/L      Calcium 10.1 mg/dL      Total Protein 8.3 g/dL      Albumin 4.7 g/dL      ALT (SGPT) 18 U/L      AST (SGOT) 16 U/L      Alkaline Phosphatase 125 U/L      Total Bilirubin 0.7 mg/dL      Globulin 3.6 gm/dL      A/G Ratio 1.3 g/dL      BUN/Creatinine Ratio 10.9     Anion Gap 20.6 mmol/L      eGFR 43.0 mL/min/1.73     Narrative:      GFR Categories in Chronic Kidney Disease (CKD)              GFR Category          GFR (mL/min/1.73)    Interpretation  G1                    90 or greater        Normal or high (1)  G2                    60-89                Mild decrease (1)  G3a                   45-59                Mild to moderate decrease  G3b                   30-44                Moderate to severe decrease  G4                    15-29                Severe decrease  G5                    14 or less           Kidney failure    (1)In the absence of evidence of kidney disease, neither GFR category G1 or G2 fulfill the criteria for CKD.    eGFR calculation 2021 CKD-EPI creatinine equation, which does not  include race as a factor    High Sensitivity Troponin T [[id number]]  (Normal) Collected: 07/04/25 2203    Specimen: Blood Updated: 07/04/25 2232     HS Troponin T 7 ng/L     Narrative:      High Sensitive Troponin T Reference Range:  <14.0 ng/L- Negative Female for AMI  <22.0 ng/L- Negative Male for AMI  >=14 - Abnormal Female indicating possible myocardial injury.  >=22 - Abnormal Male indicating possible myocardial injury.   Clinicians would have to utilize clinical acumen, EKG, Troponin, and serial changes to determine if it is an Acute Myocardial Infarction or myocardial injury due to an underlying chronic condition.         CBC Auto Differential [[id number]]  (Abnormal) Collected: 07/04/25 2203    Specimen: Blood Updated: 07/04/25 2209     WBC 13.02 10*3/mm3      RBC 5.36 10*6/mm3      Hemoglobin 14.8 g/dL      Hematocrit 45.1 %      MCV 84.1 fL      MCH 27.6 pg      MCHC 32.8 g/dL      RDW 13.1 %      RDW-SD 39.5 fl      MPV 12.5 fL      Platelets 241 10*3/mm3      Neutrophil % 66.2 %      Lymphocyte % 27.4 %      Monocyte % 5.1 %      Eosinophil % 0.6 %      Basophil % 0.4 %      Immature Grans % 0.3 %      Neutrophils, Absolute 8.62 10*3/mm3      Lymphocytes, Absolute 3.57 10*3/mm3      Monocytes, Absolute 0.66 10*3/mm3      Eosinophils, Absolute 0.08 10*3/mm3      Basophils, Absolute 0.05 10*3/mm3      Immature Grans, Absolute 0.04 10*3/mm3      nRBC 0.0 /100 WBC     TSH Rfx On Abnormal To Free T4 [[id number]] Collected: 07/04/25 2203    Specimen: Blood Updated: 07/04/25 2229    BNP [[id number]] Collected: 07/04/25 2203    Specimen: Blood Updated: 07/04/25 2229             Imaging:    No Radiology Exams Resulted Within Past 24 Hours    MDM:    Procedures                         Jose G Obando MD  22:46 EDT  07/04/25         Jose G Obando MD  07/05/25 0719

## 2025-07-05 NOTE — DISCHARGE INSTRUCTIONS
Thank you for allowing us to provide care for you today.  Your workup today revealed evidence of panic attack.  You are provided with a Ativan tablet here and your workup today was negative for any emergent findings.  You are provided with fluids secondary to suspected dehydration and a mild increase in your creatinine with improvements after fluid bolus.  I recommend that you follow-up with your primary care provider for ongoing anxiety management along with your behavioral health provider.  Thank you

## 2025-07-16 LAB
QT INTERVAL: 381 MS
QTC INTERVAL: 485 MS

## 2025-07-29 RX ORDER — BUDESONIDE AND FORMOTEROL FUMARATE DIHYDRATE 80; 4.5 UG/1; UG/1
2 AEROSOL RESPIRATORY (INHALATION) 2 TIMES DAILY
Qty: 30.6 EACH | Refills: 2 | Status: SHIPPED | OUTPATIENT
Start: 2025-07-29